# Patient Record
Sex: MALE | Race: WHITE | NOT HISPANIC OR LATINO | URBAN - METROPOLITAN AREA
[De-identification: names, ages, dates, MRNs, and addresses within clinical notes are randomized per-mention and may not be internally consistent; named-entity substitution may affect disease eponyms.]

---

## 2017-01-10 ENCOUNTER — OUTPATIENT (OUTPATIENT)
Dept: OUTPATIENT SERVICES | Facility: HOSPITAL | Age: 42
LOS: 1 days | End: 2017-01-10
Payer: OTHER MISCELLANEOUS

## 2017-01-10 DIAGNOSIS — Z90.49 ACQUIRED ABSENCE OF OTHER SPECIFIED PARTS OF DIGESTIVE TRACT: Chronic | ICD-10-CM

## 2017-01-10 DIAGNOSIS — Z98.89 OTHER SPECIFIED POSTPROCEDURAL STATES: Chronic | ICD-10-CM

## 2017-01-10 PROCEDURE — 72040 X-RAY EXAM NECK SPINE 2-3 VW: CPT

## 2017-01-10 PROCEDURE — 72040 X-RAY EXAM NECK SPINE 2-3 VW: CPT | Mod: 26

## 2017-05-17 ENCOUNTER — OUTPATIENT (OUTPATIENT)
Dept: OUTPATIENT SERVICES | Facility: HOSPITAL | Age: 42
LOS: 1 days | End: 2017-05-17
Payer: OTHER MISCELLANEOUS

## 2017-05-17 DIAGNOSIS — Z90.49 ACQUIRED ABSENCE OF OTHER SPECIFIED PARTS OF DIGESTIVE TRACT: Chronic | ICD-10-CM

## 2017-05-17 DIAGNOSIS — Z98.89 OTHER SPECIFIED POSTPROCEDURAL STATES: Chronic | ICD-10-CM

## 2017-05-17 PROCEDURE — 72040 X-RAY EXAM NECK SPINE 2-3 VW: CPT | Mod: 26

## 2017-05-17 PROCEDURE — 72040 X-RAY EXAM NECK SPINE 2-3 VW: CPT

## 2018-07-23 ENCOUNTER — TRANSCRIPTION ENCOUNTER (OUTPATIENT)
Age: 43
End: 2018-07-23

## 2019-05-21 ENCOUNTER — TRANSCRIPTION ENCOUNTER (OUTPATIENT)
Age: 44
End: 2019-05-21

## 2020-01-05 ENCOUNTER — TRANSCRIPTION ENCOUNTER (OUTPATIENT)
Age: 45
End: 2020-01-05

## 2020-01-27 ENCOUNTER — OUTPATIENT (OUTPATIENT)
Dept: OUTPATIENT SERVICES | Facility: HOSPITAL | Age: 45
LOS: 1 days | Discharge: HOME | End: 2020-01-27
Payer: COMMERCIAL

## 2020-01-27 DIAGNOSIS — Z98.89 OTHER SPECIFIED POSTPROCEDURAL STATES: Chronic | ICD-10-CM

## 2020-01-27 DIAGNOSIS — Z90.49 ACQUIRED ABSENCE OF OTHER SPECIFIED PARTS OF DIGESTIVE TRACT: Chronic | ICD-10-CM

## 2020-01-27 DIAGNOSIS — R41.3 OTHER AMNESIA: ICD-10-CM

## 2020-01-27 PROCEDURE — 70553 MRI BRAIN STEM W/O & W/DYE: CPT | Mod: 26

## 2020-02-26 DIAGNOSIS — R00.2 PALPITATIONS: ICD-10-CM

## 2020-02-26 DIAGNOSIS — Z82.49 FAMILY HISTORY OF ISCHEMIC HEART DISEASE AND OTHER DISEASES OF THE CIRCULATORY SYSTEM: ICD-10-CM

## 2020-02-26 DIAGNOSIS — Z78.9 OTHER SPECIFIED HEALTH STATUS: ICD-10-CM

## 2020-02-26 DIAGNOSIS — R06.00 DYSPNEA, UNSPECIFIED: ICD-10-CM

## 2020-02-26 DIAGNOSIS — R07.9 CHEST PAIN, UNSPECIFIED: ICD-10-CM

## 2020-02-27 ENCOUNTER — APPOINTMENT (OUTPATIENT)
Dept: CARDIOLOGY | Facility: CLINIC | Age: 45
End: 2020-02-27
Payer: COMMERCIAL

## 2020-02-27 VITALS — SYSTOLIC BLOOD PRESSURE: 110 MMHG | RESPIRATION RATE: 18 BRPM | HEART RATE: 92 BPM | DIASTOLIC BLOOD PRESSURE: 70 MMHG

## 2020-02-27 VITALS — BODY MASS INDEX: 31.78 KG/M2 | WEIGHT: 222 LBS | HEIGHT: 70 IN

## 2020-02-27 DIAGNOSIS — Z82.3 FAMILY HISTORY OF STROKE: ICD-10-CM

## 2020-02-27 PROCEDURE — 93000 ELECTROCARDIOGRAM COMPLETE: CPT

## 2020-02-27 PROCEDURE — 99204 OFFICE O/P NEW MOD 45 MIN: CPT

## 2020-10-05 ENCOUNTER — APPOINTMENT (OUTPATIENT)
Dept: CARDIOLOGY | Facility: CLINIC | Age: 45
End: 2020-10-05
Payer: COMMERCIAL

## 2020-10-05 PROCEDURE — 93306 TTE W/DOPPLER COMPLETE: CPT

## 2020-10-12 ENCOUNTER — APPOINTMENT (OUTPATIENT)
Dept: CARDIOLOGY | Facility: CLINIC | Age: 45
End: 2020-10-12
Payer: COMMERCIAL

## 2020-10-12 VITALS — HEART RATE: 80 BPM | RESPIRATION RATE: 18 BRPM | DIASTOLIC BLOOD PRESSURE: 80 MMHG | SYSTOLIC BLOOD PRESSURE: 118 MMHG

## 2020-10-12 VITALS — WEIGHT: 234 LBS | HEIGHT: 70 IN | TEMPERATURE: 96.3 F | BODY MASS INDEX: 33.5 KG/M2

## 2020-10-12 PROCEDURE — 93000 ELECTROCARDIOGRAM COMPLETE: CPT

## 2020-10-12 PROCEDURE — 99213 OFFICE O/P EST LOW 20 MIN: CPT

## 2020-10-12 NOTE — PHYSICAL EXAM
[General Appearance - Well Developed] : well developed [Normal Appearance] : normal appearance [Well Groomed] : well groomed [General Appearance - Well Nourished] : well nourished [No Deformities] : no deformities [General Appearance - In No Acute Distress] : no acute distress [Normal Conjunctiva] : the conjunctiva exhibited no abnormalities [Eyelids - No Xanthelasma] : the eyelids demonstrated no xanthelasmas [Normal Oral Mucosa] : normal oral mucosa [No Oral Pallor] : no oral pallor [No Oral Cyanosis] : no oral cyanosis [Normal Jugular Venous A Waves Present] : normal jugular venous A waves present [Normal Jugular Venous V Waves Present] : normal jugular venous V waves present [No Jugular Venous Pulliam A Waves] : no jugular venous pulliam A waves [Heart Rate And Rhythm] : heart rate and rhythm were normal [Heart Sounds] : normal S1 and S2 [Murmurs] : no murmurs present [Respiration, Rhythm And Depth] : normal respiratory rhythm and effort [Exaggerated Use Of Accessory Muscles For Inspiration] : no accessory muscle use [Auscultation Breath Sounds / Voice Sounds] : lungs were clear to auscultation bilaterally [Bowel Sounds] : normal bowel sounds [Abdomen Soft] : soft [Abdomen Tenderness] : non-tender [Abdomen Mass (___ Cm)] : no abdominal mass palpated [Abnormal Walk] : normal gait [Gait - Sufficient For Exercise Testing] : the gait was sufficient for exercise testing [Nail Clubbing] : no clubbing of the fingernails [Cyanosis, Localized] : no localized cyanosis [Petechial Hemorrhages (___cm)] : no petechial hemorrhages [Skin Color & Pigmentation] : normal skin color and pigmentation [] : no rash [No Venous Stasis] : no venous stasis [Skin Lesions] : no skin lesions [No Skin Ulcers] : no skin ulcer [No Xanthoma] : no  xanthoma was observed [Oriented To Time, Place, And Person] : oriented to person, place, and time

## 2021-09-29 ENCOUNTER — APPOINTMENT (OUTPATIENT)
Dept: CARDIOLOGY | Facility: CLINIC | Age: 46
End: 2021-09-29
Payer: COMMERCIAL

## 2021-09-29 PROCEDURE — 93306 TTE W/DOPPLER COMPLETE: CPT

## 2021-10-11 ENCOUNTER — APPOINTMENT (OUTPATIENT)
Dept: CARDIOLOGY | Facility: CLINIC | Age: 46
End: 2021-10-11
Payer: COMMERCIAL

## 2021-10-11 ENCOUNTER — TRANSCRIPTION ENCOUNTER (OUTPATIENT)
Age: 46
End: 2021-10-11

## 2021-10-11 VITALS — HEIGHT: 70 IN | BODY MASS INDEX: 33.64 KG/M2 | WEIGHT: 235 LBS

## 2021-10-11 VITALS — RESPIRATION RATE: 18 BRPM | SYSTOLIC BLOOD PRESSURE: 110 MMHG | HEART RATE: 64 BPM | DIASTOLIC BLOOD PRESSURE: 80 MMHG

## 2021-10-11 PROCEDURE — 99213 OFFICE O/P EST LOW 20 MIN: CPT

## 2021-10-11 PROCEDURE — 93000 ELECTROCARDIOGRAM COMPLETE: CPT

## 2022-09-07 ENCOUNTER — NON-APPOINTMENT (OUTPATIENT)
Age: 47
End: 2022-09-07

## 2022-09-12 ENCOUNTER — APPOINTMENT (OUTPATIENT)
Dept: CARDIOLOGY | Facility: CLINIC | Age: 47
End: 2022-09-12

## 2022-09-12 PROCEDURE — 93306 TTE W/DOPPLER COMPLETE: CPT

## 2022-10-19 ENCOUNTER — APPOINTMENT (OUTPATIENT)
Dept: CARDIOLOGY | Facility: CLINIC | Age: 47
End: 2022-10-19

## 2022-10-19 VITALS — RESPIRATION RATE: 18 BRPM | SYSTOLIC BLOOD PRESSURE: 110 MMHG | DIASTOLIC BLOOD PRESSURE: 70 MMHG | HEART RATE: 76 BPM

## 2022-10-19 VITALS — WEIGHT: 232 LBS | BODY MASS INDEX: 33.21 KG/M2 | HEIGHT: 70 IN

## 2022-10-19 PROCEDURE — 93000 ELECTROCARDIOGRAM COMPLETE: CPT

## 2022-10-19 PROCEDURE — 99214 OFFICE O/P EST MOD 30 MIN: CPT | Mod: 25

## 2022-10-19 RX ORDER — PANTOPRAZOLE 40 MG/1
40 TABLET, DELAYED RELEASE ORAL DAILY
Qty: 90 | Refills: 3 | Status: ACTIVE | COMMUNITY
Start: 2022-10-19

## 2022-10-19 RX ORDER — PANTOPRAZOLE SODIUM 40 MG/10ML
40 INJECTION, POWDER, FOR SOLUTION INTRAVENOUS
Refills: 0 | Status: DISCONTINUED | COMMUNITY
End: 2022-10-19

## 2023-01-11 ENCOUNTER — NON-APPOINTMENT (OUTPATIENT)
Age: 48
End: 2023-01-11

## 2023-04-29 NOTE — PHYSICAL EXAM
"  Reason for Disposition  • [1] Sore throat is the only symptom AND [2] present > 48 hours    Answer Assessment - Initial Assessment Questions  1  ONSET: \"When did the throat start hurting? \" (Hours or days ago)       Several days   2  SEVERITY: \"How bad is the sore throat? \" (Scale 1-10; mild, moderate or severe)    - MILD (1-3):  doesn't interfere with eating or normal activities    - MODERATE (4-7): interferes with eating some solids and normal activities    - SEVERE (8-10):  excruciating pain, interferes with most normal activities    - SEVERE DYSPHAGIA: can't swallow liquids, drooling      5/10  3  STREP EXPOSURE: \"Has there been any exposure to strep within the past week? \" If Yes, ask: \"What type of contact occurred? \"       Unsure   4  VIRAL SYMPTOMS: Morgan Aguilar there any symptoms of a cold, such as a runny nose, cough, hoarse voice or red eyes? \"       Cough and white spots in throat   5  FEVER: \"Do you have a fever? \" If Yes, ask: \"What is your temperature, how was it measured, and when did it start? \"      No   6  PUS ON THE TONSILS: \"Is there pus on the tonsils in the back of your throat? \"      Yes   7  OTHER SYMPTOMS: \"Do you have any other symptoms? \" (e g , difficulty breathing, headache, rash)      No   8  PREGNANCY: \"Is there any chance you are pregnant? \" \"When was your last menstrual period? \"      No    Protocols used: SORE THROAT-ADULT-AH    " [Normal Appearance] : normal appearance [General Appearance - Well Developed] : well developed [Well Groomed] : well groomed [General Appearance - Well Nourished] : well nourished [No Deformities] : no deformities [General Appearance - In No Acute Distress] : no acute distress [Normal Conjunctiva] : the conjunctiva exhibited no abnormalities [Eyelids - No Xanthelasma] : the eyelids demonstrated no xanthelasmas [Normal Oral Mucosa] : normal oral mucosa [No Oral Pallor] : no oral pallor [No Oral Cyanosis] : no oral cyanosis [Normal Jugular Venous A Waves Present] : normal jugular venous A waves present [Normal Jugular Venous V Waves Present] : normal jugular venous V waves present [Heart Rate And Rhythm] : heart rate and rhythm were normal [No Jugular Venous Pulliam A Waves] : no jugular venous pulliam A waves [Murmurs] : no murmurs present [Heart Sounds] : normal S1 and S2 [Respiration, Rhythm And Depth] : normal respiratory rhythm and effort [Exaggerated Use Of Accessory Muscles For Inspiration] : no accessory muscle use [Bowel Sounds] : normal bowel sounds [Auscultation Breath Sounds / Voice Sounds] : lungs were clear to auscultation bilaterally [Abdomen Soft] : soft [Abdomen Tenderness] : non-tender [Abdomen Mass (___ Cm)] : no abdominal mass palpated [Abnormal Walk] : normal gait [Gait - Sufficient For Exercise Testing] : the gait was sufficient for exercise testing [Nail Clubbing] : no clubbing of the fingernails [Petechial Hemorrhages (___cm)] : no petechial hemorrhages [Cyanosis, Localized] : no localized cyanosis [Skin Color & Pigmentation] : normal skin color and pigmentation [] : no ischemic changes [No Venous Stasis] : no venous stasis [Skin Lesions] : no skin lesions [No Xanthoma] : no  xanthoma was observed [Oriented To Time, Place, And Person] : oriented to person, place, and time [No Skin Ulcers] : no skin ulcer

## 2023-07-16 ENCOUNTER — NON-APPOINTMENT (OUTPATIENT)
Age: 48
End: 2023-07-16

## 2023-08-23 ENCOUNTER — OUTPATIENT (OUTPATIENT)
Dept: OUTPATIENT SERVICES | Facility: HOSPITAL | Age: 48
LOS: 1 days | End: 2023-08-23
Payer: COMMERCIAL

## 2023-08-23 DIAGNOSIS — Z98.89 OTHER SPECIFIED POSTPROCEDURAL STATES: Chronic | ICD-10-CM

## 2023-08-23 DIAGNOSIS — R06.00 DYSPNEA, UNSPECIFIED: ICD-10-CM

## 2023-08-23 DIAGNOSIS — Z90.49 ACQUIRED ABSENCE OF OTHER SPECIFIED PARTS OF DIGESTIVE TRACT: Chronic | ICD-10-CM

## 2023-08-23 PROCEDURE — 71046 X-RAY EXAM CHEST 2 VIEWS: CPT | Mod: 26

## 2023-08-23 PROCEDURE — 71046 X-RAY EXAM CHEST 2 VIEWS: CPT

## 2023-08-24 DIAGNOSIS — R06.00 DYSPNEA, UNSPECIFIED: ICD-10-CM

## 2023-09-06 ENCOUNTER — APPOINTMENT (OUTPATIENT)
Dept: CARDIOLOGY | Facility: CLINIC | Age: 48
End: 2023-09-06
Payer: COMMERCIAL

## 2023-09-06 PROCEDURE — 93306 TTE W/DOPPLER COMPLETE: CPT

## 2023-09-27 ENCOUNTER — APPOINTMENT (OUTPATIENT)
Dept: CARDIOLOGY | Facility: CLINIC | Age: 48
End: 2023-09-27
Payer: COMMERCIAL

## 2023-09-27 VITALS — DIASTOLIC BLOOD PRESSURE: 80 MMHG | RESPIRATION RATE: 18 BRPM | SYSTOLIC BLOOD PRESSURE: 120 MMHG | HEART RATE: 76 BPM

## 2023-09-27 VITALS — BODY MASS INDEX: 32.93 KG/M2 | HEIGHT: 70 IN | HEART RATE: 76 BPM | WEIGHT: 230 LBS

## 2023-09-27 DIAGNOSIS — I34.0 NONRHEUMATIC MITRAL (VALVE) INSUFFICIENCY: ICD-10-CM

## 2023-09-27 DIAGNOSIS — G47.30 SLEEP APNEA, UNSPECIFIED: ICD-10-CM

## 2023-09-27 DIAGNOSIS — I07.1 RHEUMATIC TRICUSPID INSUFFICIENCY: ICD-10-CM

## 2023-09-27 PROCEDURE — 93000 ELECTROCARDIOGRAM COMPLETE: CPT

## 2023-09-27 PROCEDURE — 99214 OFFICE O/P EST MOD 30 MIN: CPT | Mod: 25

## 2024-04-21 ENCOUNTER — NON-APPOINTMENT (OUTPATIENT)
Age: 49
End: 2024-04-21

## 2024-07-25 ENCOUNTER — NON-APPOINTMENT (OUTPATIENT)
Age: 49
End: 2024-07-25

## 2024-09-12 ENCOUNTER — APPOINTMENT (OUTPATIENT)
Dept: CARDIOLOGY | Facility: CLINIC | Age: 49
End: 2024-09-12
Payer: COMMERCIAL

## 2024-09-12 DIAGNOSIS — I34.0 NONRHEUMATIC MITRAL (VALVE) INSUFFICIENCY: ICD-10-CM

## 2024-09-12 PROCEDURE — 93306 TTE W/DOPPLER COMPLETE: CPT

## 2024-09-25 ENCOUNTER — APPOINTMENT (OUTPATIENT)
Dept: CARDIOLOGY | Facility: CLINIC | Age: 49
End: 2024-09-25
Payer: COMMERCIAL

## 2024-09-25 VITALS — RESPIRATION RATE: 18 BRPM | SYSTOLIC BLOOD PRESSURE: 110 MMHG | DIASTOLIC BLOOD PRESSURE: 70 MMHG

## 2024-09-25 VITALS — HEIGHT: 70 IN | WEIGHT: 231 LBS | HEART RATE: 65 BPM | BODY MASS INDEX: 33.07 KG/M2

## 2024-09-25 DIAGNOSIS — R07.9 CHEST PAIN, UNSPECIFIED: ICD-10-CM

## 2024-09-25 DIAGNOSIS — I07.1 RHEUMATIC TRICUSPID INSUFFICIENCY: ICD-10-CM

## 2024-09-25 DIAGNOSIS — I34.0 NONRHEUMATIC MITRAL (VALVE) INSUFFICIENCY: ICD-10-CM

## 2024-09-25 DIAGNOSIS — G47.30 SLEEP APNEA, UNSPECIFIED: ICD-10-CM

## 2024-09-25 PROCEDURE — 99214 OFFICE O/P EST MOD 30 MIN: CPT | Mod: 25

## 2024-09-25 PROCEDURE — 93000 ELECTROCARDIOGRAM COMPLETE: CPT

## 2024-09-25 NOTE — PHYSICAL EXAM
[General Appearance - Well Developed] : well developed [Normal Appearance] : normal appearance [Well Groomed] : well groomed [General Appearance - Well Nourished] : well nourished [No Deformities] : no deformities [General Appearance - In No Acute Distress] : no acute distress [Normal Conjunctiva] : the conjunctiva exhibited no abnormalities [Eyelids - No Xanthelasma] : the eyelids demonstrated no xanthelasmas [Normal Oral Mucosa] : normal oral mucosa [No Oral Pallor] : no oral pallor [No Oral Cyanosis] : no oral cyanosis [Normal Jugular Venous A Waves Present] : normal jugular venous A waves present [Normal Jugular Venous V Waves Present] : normal jugular venous V waves present [No Jugular Venous Pulliam A Waves] : no jugular venous pulliam A waves [Heart Rate And Rhythm] : heart rate and rhythm were normal [Heart Sounds] : normal S1 and S2 [Murmurs] : no murmurs present [Respiration, Rhythm And Depth] : normal respiratory rhythm and effort [Exaggerated Use Of Accessory Muscles For Inspiration] : no accessory muscle use [Bowel Sounds] : normal bowel sounds [Auscultation Breath Sounds / Voice Sounds] : lungs were clear to auscultation bilaterally [Abdomen Soft] : soft [Abdomen Tenderness] : non-tender [Abdomen Mass (___ Cm)] : no abdominal mass palpated [Abnormal Walk] : normal gait [Gait - Sufficient For Exercise Testing] : the gait was sufficient for exercise testing [Nail Clubbing] : no clubbing of the fingernails [Cyanosis, Localized] : no localized cyanosis [Petechial Hemorrhages (___cm)] : no petechial hemorrhages [Skin Color & Pigmentation] : normal skin color and pigmentation [] : no rash [No Venous Stasis] : no venous stasis [Skin Lesions] : no skin lesions [No Skin Ulcers] : no skin ulcer [No Xanthoma] : no  xanthoma was observed [Oriented To Time, Place, And Person] : oriented to person, place, and time

## 2024-10-14 ENCOUNTER — APPOINTMENT (OUTPATIENT)
Dept: OTOLARYNGOLOGY | Facility: CLINIC | Age: 49
End: 2024-10-14
Payer: COMMERCIAL

## 2024-10-14 VITALS — HEIGHT: 70 IN | WEIGHT: 231 LBS | BODY MASS INDEX: 33.07 KG/M2

## 2024-10-14 DIAGNOSIS — H93.13 TINNITUS, BILATERAL: ICD-10-CM

## 2024-10-14 PROCEDURE — 92550 TYMPANOMETRY & REFLEX THRESH: CPT | Mod: 52

## 2024-10-14 PROCEDURE — 92557 COMPREHENSIVE HEARING TEST: CPT

## 2024-10-14 PROCEDURE — 99203 OFFICE O/P NEW LOW 30 MIN: CPT

## 2024-10-14 RX ORDER — AMITRIPTYLINE HYDROCHLORIDE 10 MG/1
10 TABLET, FILM COATED ORAL
Qty: 50 | Refills: 1 | Status: ACTIVE | COMMUNITY
Start: 2024-10-14 | End: 1900-01-01

## 2025-07-19 ENCOUNTER — RESULT REVIEW (OUTPATIENT)
Age: 50
End: 2025-07-19

## 2025-07-19 ENCOUNTER — OUTPATIENT (OUTPATIENT)
Dept: OUTPATIENT SERVICES | Facility: HOSPITAL | Age: 50
LOS: 1 days | End: 2025-07-19
Payer: COMMERCIAL

## 2025-07-19 DIAGNOSIS — Z98.89 OTHER SPECIFIED POSTPROCEDURAL STATES: Chronic | ICD-10-CM

## 2025-07-19 DIAGNOSIS — Z90.49 ACQUIRED ABSENCE OF OTHER SPECIFIED PARTS OF DIGESTIVE TRACT: Chronic | ICD-10-CM

## 2025-07-19 DIAGNOSIS — R07.9 CHEST PAIN, UNSPECIFIED: ICD-10-CM

## 2025-07-19 DIAGNOSIS — Z00.8 ENCOUNTER FOR OTHER GENERAL EXAMINATION: ICD-10-CM

## 2025-07-19 PROCEDURE — 75571 CT HRT W/O DYE W/CA TEST: CPT

## 2025-07-19 PROCEDURE — 75571 CT HRT W/O DYE W/CA TEST: CPT | Mod: 26

## 2025-07-20 DIAGNOSIS — R07.9 CHEST PAIN, UNSPECIFIED: ICD-10-CM

## 2025-09-15 ENCOUNTER — APPOINTMENT (OUTPATIENT)
Dept: CARDIOLOGY | Facility: CLINIC | Age: 50
End: 2025-09-15
Payer: COMMERCIAL

## 2025-09-15 PROCEDURE — 93306 TTE W/DOPPLER COMPLETE: CPT

## 2025-09-22 PROBLEM — I34.0 MILD MITRAL REGURGITATION: Status: RESOLVED | Noted: 2020-02-26 | Resolved: 2025-09-22

## 2025-09-22 PROBLEM — I07.1 MILD TRICUSPID REGURGITATION: Status: RESOLVED | Noted: 2020-02-26 | Resolved: 2025-09-22

## 2025-09-22 PROBLEM — Z87.898 HISTORY OF DYSPNEA: Status: RESOLVED | Noted: 2020-02-26 | Resolved: 2025-09-22

## 2025-09-22 PROBLEM — Z87.898 HISTORY OF PALPITATIONS: Status: RESOLVED | Noted: 2020-02-26 | Resolved: 2025-09-22

## 2025-09-22 PROBLEM — I45.10 RIGHT BUNDLE-BRANCH BLOCK: Status: ACTIVE | Noted: 2025-09-22

## 2025-09-22 PROBLEM — Z87.898 HISTORY OF CHEST PAIN: Status: RESOLVED | Noted: 2020-02-26 | Resolved: 2025-09-22
